# Patient Record
Sex: FEMALE | Race: WHITE | NOT HISPANIC OR LATINO | URBAN - METROPOLITAN AREA
[De-identification: names, ages, dates, MRNs, and addresses within clinical notes are randomized per-mention and may not be internally consistent; named-entity substitution may affect disease eponyms.]

---

## 2017-11-18 ENCOUNTER — EMERGENCY (EMERGENCY)
Facility: HOSPITAL | Age: 22
LOS: 1 days | Discharge: HOME | End: 2017-11-18
Attending: EMERGENCY MEDICINE | Admitting: EMERGENCY MEDICINE
Payer: COMMERCIAL

## 2017-11-18 VITALS
RESPIRATION RATE: 18 BRPM | DIASTOLIC BLOOD PRESSURE: 72 MMHG | TEMPERATURE: 98 F | OXYGEN SATURATION: 100 % | SYSTOLIC BLOOD PRESSURE: 108 MMHG | HEART RATE: 80 BPM

## 2017-11-18 DIAGNOSIS — R41.82 ALTERED MENTAL STATUS, UNSPECIFIED: ICD-10-CM

## 2017-11-18 DIAGNOSIS — F10.120 ALCOHOL ABUSE WITH INTOXICATION, UNCOMPLICATED: ICD-10-CM

## 2017-11-18 PROCEDURE — 99284 EMERGENCY DEPT VISIT MOD MDM: CPT | Mod: 25

## 2017-11-18 NOTE — ED ADULT NURSE NOTE - OBJECTIVE STATEMENT
23 y/o female with no significant medical hx arrived to [facility] ER was BIBA for altered mental status secondary alcohol intoxication. As per EMS, patient was found sleeping in cab and admitted to drinking an excessive amount of alcohol. Pt denies chest pain, headache, dizziness, blurred vision, slurred speech, nausea, vomiting, diarrhea, fever, chills, LOC, SOB, weakness, fatigue, recent travel, recent injury, and palpitations. Upon assessment, no visible injuries observed, abdomen soft, lung fields WNL, breathing is equal and unlabored, pulses palpable. Pt verbalized that she drank a little bit than usual and she regrets it. Care in progress. Awaiting disposition

## 2017-11-18 NOTE — ED PROVIDER NOTE - NS ED ROS FT
CONSTITUTIONAL: No fever, chills, or weakness  NEURO: No headache, no dizziness, no syncope; No weakness/tingling/numbness  EYES: No visual changes  ENT: No rhinorrhea or sore throat  PULM: No cough or dyspnea  CV: No chest pain or palpitations  GI: No abdominal pain or diarrhea  : No dysuria, hematuria, frequency  MSK: No neck pain or back pain, no joint pain  SKIN: no rash

## 2017-11-18 NOTE — ED ADULT TRIAGE NOTE - ARRIVAL INFO ADDITIONAL COMMENTS
pt was found in cab, vomiting. states she drank alcohol. unknown amount. pt is awake, a.o.x3. speaking in full sentences. equal and b.l chest rises noted.

## 2017-11-18 NOTE — ED PROVIDER NOTE - MEDICAL DECISION MAKING DETAILS
Moderately intoxicated, will check fingerstick glucose and allow her to rest for a while to sober up before discharging home.  No signs of trauma.

## 2017-11-18 NOTE — ED PROVIDER NOTE - OBJECTIVE STATEMENT
pt was drinkinig with friends tonight, started vomiting in cab brought to ed by friends.  she says she had "a little too much vodka."  she is awake, alert, oriented to self, neighborhood [facility], weekend in november.  speech is clear but she is embarrassed and a little tearful about it.  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   denies coingestions, depression, or intent to harm herself.  denies any fall or injury.    denies PMHx meds allergies  denies any drug or tobacco use

## 2017-11-18 NOTE — ED PROVIDER NOTE - PHYSICAL EXAMINATION
CONSTITUTIONAL: WD,WN. NAD.    SKIN: Normal color and turgor. No rash.    HEAD: NC/AT.  EYES: Conjunctiva clear no injection. EOMI. PERRL.  No nystagmus.  ENT: Airway patent, OP clear. Nasal mucosa clear, no rhinorrhea.   RESPIRATORY:  Breathing non-labored. No retractions or accessory muscle use.  Lungs CTA bilat.  CARDIOVASCULAR:  RRR, S1S2. No M/R/G.      GI:  Abdomen soft, nontender.    MSK: Neck supple with painless ROM.  No extremity edema or tenderness.  No joint swelling or ROM limitation.  NEURO: Alert and oriented; ANTONIO with normal strength.  Normal speech.  Will let her sleep for a while before testing gait.

## 2017-11-19 VITALS
DIASTOLIC BLOOD PRESSURE: 74 MMHG | RESPIRATION RATE: 18 BRPM | HEART RATE: 100 BPM | OXYGEN SATURATION: 100 % | SYSTOLIC BLOOD PRESSURE: 109 MMHG | TEMPERATURE: 98 F

## 2017-11-19 PROCEDURE — 82962 GLUCOSE BLOOD TEST: CPT

## 2017-11-19 PROCEDURE — 99285 EMERGENCY DEPT VISIT HI MDM: CPT

## 2019-05-15 NOTE — ED PROVIDER NOTE - CARE PLAN
Your opinion matters!  Thank you for choosing the [facility]. You might receive a survey in the mail about your experience today to evaluate our clinic.  Please fill it out and return it in the pre-paid envelope.  It was a pleasure to care for you today!     Test Results:  Our goal is to report all test results ordered by our care provider within 7-14 days.  If you do not receive your test results either by phone or by mail within 14 days after your visit with our office please call our office at [phone] and ask to speak with a member of our care team.         Principal Discharge DX:	Alcohol intoxication